# Patient Record
Sex: MALE | Race: WHITE | Employment: OTHER | URBAN - METROPOLITAN AREA
[De-identification: names, ages, dates, MRNs, and addresses within clinical notes are randomized per-mention and may not be internally consistent; named-entity substitution may affect disease eponyms.]

---

## 2024-07-10 ENCOUNTER — OFFICE VISIT (OUTPATIENT)
Dept: ENDOCRINOLOGY | Age: 59
End: 2024-07-10
Payer: MEDICAID

## 2024-07-10 VITALS
HEART RATE: 95 BPM | DIASTOLIC BLOOD PRESSURE: 83 MMHG | BODY MASS INDEX: 18.75 KG/M2 | HEIGHT: 70 IN | SYSTOLIC BLOOD PRESSURE: 138 MMHG | OXYGEN SATURATION: 97 % | WEIGHT: 131 LBS

## 2024-07-10 DIAGNOSIS — E55.9 VITAMIN D DEFICIENCY: ICD-10-CM

## 2024-07-10 DIAGNOSIS — E05.90 HYPERTHYROIDISM: Primary | ICD-10-CM

## 2024-07-10 DIAGNOSIS — E04.2 MULTIPLE THYROID NODULES: ICD-10-CM

## 2024-07-10 PROCEDURE — 3017F COLORECTAL CA SCREEN DOC REV: CPT | Performed by: CLINICAL NURSE SPECIALIST

## 2024-07-10 PROCEDURE — 4004F PT TOBACCO SCREEN RCVD TLK: CPT | Performed by: CLINICAL NURSE SPECIALIST

## 2024-07-10 PROCEDURE — G8427 DOCREV CUR MEDS BY ELIG CLIN: HCPCS | Performed by: CLINICAL NURSE SPECIALIST

## 2024-07-10 PROCEDURE — 99205 OFFICE O/P NEW HI 60 MIN: CPT | Performed by: CLINICAL NURSE SPECIALIST

## 2024-07-10 PROCEDURE — G8420 CALC BMI NORM PARAMETERS: HCPCS | Performed by: CLINICAL NURSE SPECIALIST

## 2024-07-10 RX ORDER — CHLORHEXIDINE GLUCONATE ORAL RINSE 1.2 MG/ML
SOLUTION DENTAL
COMMUNITY

## 2024-07-10 RX ORDER — ZOLPIDEM TARTRATE 10 MG/1
1 TABLET ORAL NIGHTLY
COMMUNITY

## 2024-07-10 RX ORDER — FINASTERIDE 1 MG/1
1 TABLET, FILM COATED ORAL DAILY
COMMUNITY

## 2024-07-10 RX ORDER — HYDROCHLOROTHIAZIDE 25 MG/1
25 TABLET ORAL DAILY
COMMUNITY

## 2024-07-10 RX ORDER — ALBUTEROL SULFATE 90 UG/1
AEROSOL, METERED RESPIRATORY (INHALATION)
COMMUNITY

## 2024-07-10 RX ORDER — ROSUVASTATIN CALCIUM 10 MG/1
1 TABLET, COATED ORAL DAILY
COMMUNITY

## 2024-07-10 RX ORDER — PROMETHAZINE HYDROCHLORIDE 25 MG/1
25 TABLET ORAL 2 TIMES DAILY PRN
COMMUNITY

## 2024-07-10 RX ORDER — LOSARTAN POTASSIUM 50 MG/1
50 TABLET ORAL DAILY
COMMUNITY

## 2024-07-10 NOTE — PROGRESS NOTES
hot, radioactive iodine treatment will be warranted   2. Vitamin D deficiency   Patient at risk of vitamin D deficiency  Will check level   3. Multiple thyroid nodules   See above  Will obtain uptake and scan  Will follow results and proceed accordingly         I personally spent 60 minutes reviewing external notes from PCP and other patient's care team providers, and personally interpreted labs associated with the above diagnosis. I also ordered labs to further assess and manage the above addressed medical conditions.     Return in about 6 months (around 1/10/2025).    The above issues were reviewed with the patient who understood and agreed with the plan.    Thank you for allowing us to participate in the care of this patient. Please do not hesitate to contact us with any additional questions.     GILBERT Goel     [facility]   [address] [place] [address], [address], [place], [place], [address]  Phone: [phone]  Fax: [phone]  --------------------------------------------  An electronic signature was used to authenticate this note. GILBERT Goel on 7/10/2024 at 10:02 AM

## 2024-07-10 NOTE — PROGRESS NOTES
noncompliance  Discussed with patient the importance of eating consistent carbohydrate meals, avoiding high glycemic index food. Also, discussed with patient the risk and negative consequences of dietary noncompliance on blood glucose control, blood pressure and weight      Obesity  Discussed lifestyle changes including diet and exercise with patient in depth. Also discussed with patient cardiovascular risk associated with obesity    I personally reviewed external notes from PCP and other patient's care team providers, and personally interpreted labs associated with the above diagnosis. I also ordered labs to further assess and manage the above addressed medical conditions.     No follow-ups on file.    The above issues were reviewed with the patient who understood and agreed with the plan.    Thank you for allowing us to participate in the care of this patient. Please do not hesitate to contact us with any additional questions.     GILBERT Goel     [facility]   [address] [place] [address], [address], [place], [address]  Phone: [phone]  Fax: [phone]  --------------------------------------------  An electronic signature was used to authenticate this note. GILBERT Goel on 7/10/2024 at 8:50 AM

## 2024-07-18 ENCOUNTER — HOSPITAL ENCOUNTER (OUTPATIENT)
Age: 59
Discharge: HOME | End: 2024-07-18
Payer: MEDICAID

## 2024-07-18 ENCOUNTER — HOSPITAL ENCOUNTER (OUTPATIENT)
Dept: NUCLEAR MEDICINE | Age: 59
Discharge: HOME | End: 2024-07-18
Payer: MEDICAID

## 2024-07-18 DIAGNOSIS — E05.90 HYPERTHYROIDISM: ICD-10-CM

## 2024-07-18 DIAGNOSIS — E55.9 VITAMIN D DEFICIENCY: ICD-10-CM

## 2024-07-18 LAB
25(OH)D3 SERPL-MCNC: 31.5 NG/ML (ref 30–100)
T3FREE SERPL-MCNC: 4.5 PG/ML (ref 2–4.4)
T4 FREE SERPL-MCNC: 1.2 NG/DL (ref 0.9–1.7)
TSH SERPL DL<=0.05 MIU/L-ACNC: 0.01 UIU/ML (ref 0.27–4.2)

## 2024-07-18 PROCEDURE — 84443 ASSAY THYROID STIM HORMONE: CPT

## 2024-07-18 PROCEDURE — 84445 ASSAY OF TSI GLOBULIN: CPT

## 2024-07-18 PROCEDURE — A9516 IODINE I-123 SOD IODIDE MIC: HCPCS | Performed by: RADIOLOGY

## 2024-07-18 PROCEDURE — 3430000000 HC RX DIAGNOSTIC RADIOPHARMACEUTICAL: Performed by: RADIOLOGY

## 2024-07-18 PROCEDURE — 82306 VITAMIN D 25 HYDROXY: CPT

## 2024-07-18 PROCEDURE — 84481 FREE ASSAY (FT-3): CPT

## 2024-07-18 PROCEDURE — 78014 THYROID IMAGING W/BLOOD FLOW: CPT

## 2024-07-18 PROCEDURE — 84439 ASSAY OF FREE THYROXINE: CPT

## 2024-07-18 PROCEDURE — 36415 COLL VENOUS BLD VENIPUNCTURE: CPT

## 2024-07-18 RX ORDER — SODIUM IODIDE I 123 100 UCI/1
200 CAPSULE, GELATIN COATED ORAL ONCE
Status: COMPLETED | OUTPATIENT
Start: 2024-07-18 | End: 2024-07-18

## 2024-07-18 RX ADMIN — SODIUM IODIDE I 123 200 MICRO CURIE: 100 CAPSULE, GELATIN COATED ORAL at 08:19

## 2024-07-19 ENCOUNTER — HOSPITAL ENCOUNTER (OUTPATIENT)
Dept: NUCLEAR MEDICINE | Age: 59
Discharge: HOME | End: 2024-07-19
Payer: MEDICAID

## 2024-07-20 LAB — THYROID STIMULATING IMMUNOGLOB: <0.1 IU/L

## 2024-07-21 DIAGNOSIS — E05.90 HYPERTHYROIDISM: Primary | ICD-10-CM

## 2024-07-22 ENCOUNTER — TELEPHONE (OUTPATIENT)
Dept: ENDOCRINOLOGY | Age: 59
End: 2024-07-22

## 2024-07-22 NOTE — TELEPHONE ENCOUNTER
Pt notified uptake and scan consistent with hot nodule.  Kade recommend pt have SHIPLEY TX treatment.  SHIPLEY tx ordered     Gave pt precautions prior to and after treatment and will also email him patient handouts.

## 2024-07-22 NOTE — TELEPHONE ENCOUNTER
----- Message from GILBERT Goel - CNS sent at 7/21/2024  6:33 PM EDT -----  Please call pt and inform him that uptake and scan consistent with hot nodule.  I recommend pt have SHIPLEY TX treatment.  SHIPLEY tx ordered

## 2024-07-23 ENCOUNTER — TELEPHONE (OUTPATIENT)
Dept: ENDOCRINOLOGY | Age: 59
End: 2024-07-23

## 2024-07-23 NOTE — TELEPHONE ENCOUNTER
Patient calling with a few questions after reading risks with Radioactive iodine treatment. Pt states he is mostly asymptomatic with hyperthyroid other than a rapid heart rate which he says he has had his whole life and is no different to him. So he is trying to weigh the benefit vs risk of the procedure and is wondering what risks he would have if he decided not to go forward with treatment. One of his biggest concerns is the risk of damage to salivary glands and is wondering if you know if this is common occurrence or a small risk?

## 2024-12-19 LAB
ALBUMIN: 4.5 G/DL
ALP BLD-CCNC: 76 U/L
ALT SERPL-CCNC: 20 U/L
ANION GAP SERPL CALCULATED.3IONS-SCNC: NORMAL MMOL/L
AST SERPL-CCNC: 17 U/L
BASOPHILS ABSOLUTE: NORMAL
BASOPHILS RELATIVE PERCENT: NORMAL
BILIRUB SERPL-MCNC: 0.5 MG/DL (ref 0.1–1.4)
BUN BLDV-MCNC: 17 MG/DL
CALCIUM SERPL-MCNC: 9.8 MG/DL
CHLORIDE BLD-SCNC: 102 MMOL/L
CHOLESTEROL, TOTAL: 156 MG/DL
CHOLESTEROL/HDL RATIO: 3.9
CO2: 24 MMOL/L
CREAT SERPL-MCNC: 1.11 MG/DL
EOSINOPHILS ABSOLUTE: NORMAL
EOSINOPHILS RELATIVE PERCENT: NORMAL
GFR, ESTIMATED: 76
GLUCOSE BLD-MCNC: 103 MG/DL
HCT VFR BLD CALC: 49.8 % (ref 41–53)
HDLC SERPL-MCNC: 40 MG/DL (ref 35–70)
HEMOGLOBIN: 16.2 G/DL (ref 13.5–17.5)
LDL CHOLESTEROL: 95
LYMPHOCYTES ABSOLUTE: NORMAL
LYMPHOCYTES RELATIVE PERCENT: NORMAL
MCH RBC QN AUTO: 28.3 PG
MCHC RBC AUTO-ENTMCNC: 32.5 G/DL
MCV RBC AUTO: 87.1 FL
MONOCYTES ABSOLUTE: NORMAL
MONOCYTES RELATIVE PERCENT: NORMAL
NEUTROPHILS ABSOLUTE: NORMAL
NEUTROPHILS RELATIVE PERCENT: NORMAL
NONHDLC SERPL-MCNC: 116 MG/DL
PLATELET # BLD: 294 K/ΜL
PMV BLD AUTO: 11 FL
POTASSIUM SERPL-SCNC: 3.9 MMOL/L
PROSTATE SPECIFIC ANTIGEN: 0.67 NG/ML
RBC # BLD: 5.72 10^6/ΜL
SODIUM BLD-SCNC: 139 MMOL/L
TOTAL PROTEIN: 6.8 G/DL (ref 6.4–8.2)
TRIGL SERPL-MCNC: 110 MG/DL
VLDLC SERPL CALC-MCNC: NORMAL MG/DL
WBC # BLD: 7.3 10^3/ML

## 2025-01-13 ENCOUNTER — OFFICE VISIT (OUTPATIENT)
Dept: ENDOCRINOLOGY | Age: 60
End: 2025-01-13
Payer: MEDICAID

## 2025-01-13 VITALS
BODY MASS INDEX: 19.33 KG/M2 | DIASTOLIC BLOOD PRESSURE: 88 MMHG | SYSTOLIC BLOOD PRESSURE: 120 MMHG | HEART RATE: 79 BPM | HEIGHT: 70 IN | WEIGHT: 135 LBS | OXYGEN SATURATION: 98 %

## 2025-01-13 DIAGNOSIS — E04.2 MULTIPLE THYROID NODULES: ICD-10-CM

## 2025-01-13 DIAGNOSIS — E05.10 TOXIC ADENOMA: ICD-10-CM

## 2025-01-13 DIAGNOSIS — E05.90 HYPERTHYROIDISM: Primary | ICD-10-CM

## 2025-01-13 DIAGNOSIS — E55.9 VITAMIN D DEFICIENCY: ICD-10-CM

## 2025-01-13 PROCEDURE — G2211 COMPLEX E/M VISIT ADD ON: HCPCS | Performed by: CLINICAL NURSE SPECIALIST

## 2025-01-13 PROCEDURE — 3017F COLORECTAL CA SCREEN DOC REV: CPT | Performed by: CLINICAL NURSE SPECIALIST

## 2025-01-13 PROCEDURE — G8427 DOCREV CUR MEDS BY ELIG CLIN: HCPCS | Performed by: CLINICAL NURSE SPECIALIST

## 2025-01-13 PROCEDURE — G8420 CALC BMI NORM PARAMETERS: HCPCS | Performed by: CLINICAL NURSE SPECIALIST

## 2025-01-13 PROCEDURE — 4004F PT TOBACCO SCREEN RCVD TLK: CPT | Performed by: CLINICAL NURSE SPECIALIST

## 2025-01-13 PROCEDURE — 99214 OFFICE O/P EST MOD 30 MIN: CPT | Performed by: CLINICAL NURSE SPECIALIST

## 2025-01-13 NOTE — PROGRESS NOTES
[employer]  [facility] Department of Endocrinology Diabetes and Metabolism   [address], [place], [place], [address]  Phone: [phone]  Fax: [phone]    Date of Service: 1/13/2025    Primary Care Physician: Monty Conroy  Referring physician: No ref. provider found  Provider: Kade Hebert APRN - CNS     Reason for the visit:  Hyperthyroidism and thyroid nodules       History of Present Illness:  The history is provided by the patient. No  was used. Accuracy of the patient data is excellent.  Octavio Smalls is a very pleasant 59 y.o. male seen today for hyperthyroidism and thyroid nodules     Patient diagnosed with hyperthyroidism 10/23.  Context:  Pt was having palpations and hx of tachycardia .  Pt states he has had Tachycardia since he was in his 20's    Symptoms associated with hyperthyroidism includes denies .    Tests the patient has completes  includes see below .    Treatment the patient has done includes non3 .    Last labs include (10/23) TSH < 0.01, FT4 1.19, T3 141  Thyroid US 10/23  .   Right lobe measuring 5.3 x 1.1 x 1.2 cm and left lobe measuring 5.1 x 2.2 x 3.2 cm  Left nodule measuring 3.6 x 2.8 x 1.9 cm hypoechoic with punctate echogenic foci  Right nodule measuring 0.6 x 0.6 x 0.5 cm    Uptake and scan showed 24-hour uptake 47% consistent with toxic nodule in the left thyroid lobe    TSI negative (7/24)     He was supposed to have radioactive iodine however he was hesitant to do this  He is looking at [facility] to see if he can have RFA completed instead    He recently had blood work done at his PCPs office will need to obtain results    PAST MEDICAL HISTORY   Past Medical History:   Diagnosis Date    Alopecia     Anxiety     Asthma     Hypertension     Hyperthyroidism     Insomnia     Somatization disorder     Xerostomia        PAST SURGICAL HISTORY   No past surgical history on file.    SOCIAL HISTORY   Tobacco:   reports that he

## 2025-01-17 ENCOUNTER — HOSPITAL ENCOUNTER (OUTPATIENT)
Age: 60
Discharge: HOME | End: 2025-01-17
Payer: MEDICAID

## 2025-01-17 LAB
T3FREE SERPL-MCNC: 3.83 PG/ML (ref 2–4.4)
T4 FREE SERPL-MCNC: 1.1 NG/DL (ref 0.9–1.7)
TSH SERPL DL<=0.05 MIU/L-ACNC: 0.01 UIU/ML (ref 0.27–4.2)

## 2025-01-17 PROCEDURE — 36415 COLL VENOUS BLD VENIPUNCTURE: CPT

## 2025-01-17 PROCEDURE — 84439 ASSAY OF FREE THYROXINE: CPT

## 2025-01-17 PROCEDURE — 84481 FREE ASSAY (FT-3): CPT

## 2025-01-17 PROCEDURE — 84443 ASSAY THYROID STIM HORMONE: CPT

## 2025-01-20 ENCOUNTER — TELEPHONE (OUTPATIENT)
Dept: ENDOCRINOLOGY | Age: 60
End: 2025-01-20

## 2025-01-20 DIAGNOSIS — E05.90 HYPERTHYROIDISM: ICD-10-CM

## 2025-01-20 DIAGNOSIS — E55.9 VITAMIN D DEFICIENCY: Primary | ICD-10-CM

## 2025-01-20 RX ORDER — METHIMAZOLE 5 MG/1
5 TABLET ORAL DAILY
Qty: 30 TABLET | Refills: 3 | Status: SHIPPED | OUTPATIENT
Start: 2025-01-20 | End: 2025-02-19

## 2025-01-20 NOTE — TELEPHONE ENCOUNTER
Pt notified TSH is suppressed.  Until he makes a decision on radioactive iodine Start methimazole 5 mg daily.  Prescription sent.  Recheck TFTs in 8 weeks.  Labs ordered

## 2025-01-20 NOTE — TELEPHONE ENCOUNTER
----- Message from Kade QUINONES sent at 1/20/2025  7:53 AM EST -----  Please call patient and inform him TSH is suppressed.  Until patient makes a decision on radioactive iodine please have patient start methimazole 5 mg daily.  Prescription sent.  Recheck TFTs in 8 weeks.  Labs ordered